# Patient Record
Sex: FEMALE | URBAN - METROPOLITAN AREA
[De-identification: names, ages, dates, MRNs, and addresses within clinical notes are randomized per-mention and may not be internally consistent; named-entity substitution may affect disease eponyms.]

---

## 2022-06-20 ENCOUNTER — APPOINTMENT (RX ONLY)
Dept: URBAN - METROPOLITAN AREA CLINIC 308 | Facility: CLINIC | Age: 67
Setting detail: DERMATOLOGY
End: 2022-06-20

## 2022-06-20 DIAGNOSIS — L30.8 OTHER SPECIFIED DERMATITIS: ICD-10-CM | Status: INADEQUATELY CONTROLLED

## 2022-06-20 PROCEDURE — 11105 PUNCH BX SKIN EA SEP/ADDL: CPT

## 2022-06-20 PROCEDURE — ? COUNSELING

## 2022-06-20 PROCEDURE — ? PRESCRIPTION

## 2022-06-20 PROCEDURE — ? BIOPSY BY PUNCH METHOD

## 2022-06-20 PROCEDURE — 11104 PUNCH BX SKIN SINGLE LESION: CPT

## 2022-06-20 PROCEDURE — ? ADDITIONAL NOTES

## 2022-06-20 RX ORDER — PREDNISONE 10 MG/1
TABLET ORAL
Qty: 34 | Refills: 0 | Status: ERX | COMMUNITY
Start: 2022-06-20

## 2022-06-20 RX ADMIN — PREDNISONE: 10 TABLET ORAL at 00:00

## 2022-06-20 ASSESSMENT — LOCATION DETAILED DESCRIPTION DERM
LOCATION DETAILED: RIGHT VENTRAL DISTAL FOREARM
LOCATION DETAILED: LEFT VENTRAL LATERAL DISTAL FOREARM

## 2022-06-20 ASSESSMENT — LOCATION ZONE DERM: LOCATION ZONE: ARM

## 2022-06-20 ASSESSMENT — LOCATION SIMPLE DESCRIPTION DERM
LOCATION SIMPLE: RIGHT FOREARM
LOCATION SIMPLE: LEFT FOREARM

## 2022-06-20 NOTE — HPI: RASH
Is This A New Presentation, Or A Follow-Up?: Rash
Additional History: Patient endorses history of developing sun \"blotches\" which she describes as red blotches that develop in sun exposed areas after sun exposure.  She notes this episode of rash began around 1 month ago after she was working in her yard.  Originally described as red blotches on her BUE but she states they have spread to her posterior BLE and involve her upper back.  Rash is described as burning, pruritic, and painful.  She states over the weekend, she developed blisters on top of her \"blotches\".  Previous treatments include clotrimazole (prescribed by urgent care) and triamcinolone cream.  Patient has not noted improvement with these treatments.  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  Patient's daughter has a history of lupus.  Patient has had been under a lot of stress with her new medical conditions and her daughter passed away in the end of 2021.  Denies fevers and chills.  Endorses fatigue.

## 2022-06-20 NOTE — PROCEDURE: ADDITIONAL NOTES
Additional Notes: Patient endorses developing erythematous \"blotches\" after periods of sun exposure.  This has progressed to development of blisters over the erythematous blotches for the past month.  Rash is described as burning and painful.  She has tried topical antifungal and triamcinolone without improvement of symptoms.  She is on amlodipine and HCTZ (started a few weeks ago) for HTN.  Ddx per above.  Prednisone per above.  Patient was encouraged to start vitamin D3 and calcium for bone protection while on prednisone.  Patient seen briefly with Dr. Paz.
Render Risk Assessment In Note?: yes
Detail Level: Simple

## 2022-06-20 NOTE — PROCEDURE: BIOPSY BY PUNCH METHOD
Detail Level: Detailed
Was A Bandage Applied: Yes
Punch Size In Mm: 4
Biopsy Type: DIF (perilesional)
Anesthesia Type: 1% lidocaine with epinephrine
Anesthesia Volume In Cc (Will Not Render If 0): 1
Additional Anesthesia Volume In Cc (Will Not Render If 0): 0
Hemostasis: None
Epidermal Sutures: 4-0 Ethilon
Wound Care: Petrolatum
Dressing: bandage
Suture Removal: 8 days
Path Notes (To The Dermatopathologist): Please look at pictures in EMA.
Consent: Verbal consent was obtained and risks were reviewed including but not limited to scarring, infection, bleeding, scabbing, incomplete removal, nerve damage and allergy to anesthesia.
Render Post-Care Instructions In Note?: no
Post-Care Instructions: I reviewed with the patient in detail post-care instructions. Patient is to keep the biopsy site dry overnight, and then apply bacitracin twice daily until healed. Patient may apply hydrogen peroxide soaks to remove any crusting.
Home Suture Removal Text: Patient was provided a home suture removal kit and will remove their sutures at home.  If they have any questions or difficulties they will call the office.
Notification Instructions: Patient will be notified of biopsy results. However, patient instructed to call the office if not contacted within 2 weeks.
Billing Type: Third-Party Bill
Information: Selecting Yes will display possible errors in your note based on the variables you have selected. This validation is only offered as a suggestion for you. PLEASE NOTE THAT THE VALIDATION TEXT WILL BE REMOVED WHEN YOU FINALIZE YOUR NOTE. IF YOU WANT TO FAX A PRELIMINARY NOTE YOU WILL NEED TO TOGGLE THIS TO 'NO' IF YOU DO NOT WANT IT IN YOUR FAXED NOTE.
Biopsy Type: H and E